# Patient Record
Sex: FEMALE | Race: OTHER | HISPANIC OR LATINO | URBAN - METROPOLITAN AREA
[De-identification: names, ages, dates, MRNs, and addresses within clinical notes are randomized per-mention and may not be internally consistent; named-entity substitution may affect disease eponyms.]

---

## 2023-11-09 ENCOUNTER — EMERGENCY (EMERGENCY)
Facility: HOSPITAL | Age: 28
LOS: 1 days | Discharge: HOME | End: 2023-11-09
Attending: STUDENT IN AN ORGANIZED HEALTH CARE EDUCATION/TRAINING PROGRAM | Admitting: EMERGENCY MEDICINE
Payer: MEDICAID

## 2023-11-09 VITALS
SYSTOLIC BLOOD PRESSURE: 118 MMHG | TEMPERATURE: 98 F | RESPIRATION RATE: 15 BRPM | HEART RATE: 92 BPM | OXYGEN SATURATION: 100 % | DIASTOLIC BLOOD PRESSURE: 86 MMHG

## 2023-11-09 DIAGNOSIS — J36 PERITONSILLAR ABSCESS: ICD-10-CM

## 2023-11-09 LAB
GRAM STN FLD: ABNORMAL
GRAM STN FLD: ABNORMAL
SPECIMEN SOURCE: SIGNIFICANT CHANGE UP
SPECIMEN SOURCE: SIGNIFICANT CHANGE UP

## 2023-11-09 PROCEDURE — 99223 1ST HOSP IP/OBS HIGH 75: CPT

## 2023-11-09 RX ORDER — KETOROLAC TROMETHAMINE 30 MG/ML
15 SYRINGE (ML) INJECTION ONCE
Refills: 0 | Status: DISCONTINUED | OUTPATIENT
Start: 2023-11-09 | End: 2023-11-09

## 2023-11-09 RX ORDER — DEXAMETHASONE 0.5 MG/5ML
10 ELIXIR ORAL ONCE
Refills: 0 | Status: COMPLETED | OUTPATIENT
Start: 2023-11-09 | End: 2023-11-09

## 2023-11-09 RX ORDER — LIDOCAINE HYDROCHLORIDE AND EPINEPHRINE 10; 10 MG/ML; UG/ML
10 INJECTION, SOLUTION INFILTRATION; PERINEURAL ONCE
Refills: 0 | Status: COMPLETED | OUTPATIENT
Start: 2023-11-09 | End: 2023-11-09

## 2023-11-09 RX ORDER — KETOROLAC TROMETHAMINE 30 MG/ML
30 SYRINGE (ML) INJECTION EVERY 6 HOURS
Refills: 0 | Status: COMPLETED | OUTPATIENT
Start: 2023-11-10 | End: 2023-11-11

## 2023-11-09 RX ORDER — LIDOCAINE 4 G/100G
10 CREAM TOPICAL ONCE
Refills: 0 | Status: COMPLETED | OUTPATIENT
Start: 2023-11-09 | End: 2023-11-09

## 2023-11-09 RX ORDER — DEXAMETHASONE 0.5 MG/5ML
10 ELIXIR ORAL ONCE
Refills: 0 | Status: DISCONTINUED | OUTPATIENT
Start: 2023-11-09 | End: 2023-11-09

## 2023-11-09 RX ORDER — TETRACAINE/BENZOCAINE/BUTAMBEN 2%-14%-2%
1 OINTMENT (GRAM) TOPICAL ONCE
Refills: 0 | Status: COMPLETED | OUTPATIENT
Start: 2023-11-09 | End: 2023-11-09

## 2023-11-09 RX ORDER — SODIUM CHLORIDE 9 MG/ML
1000 INJECTION INTRAMUSCULAR; INTRAVENOUS; SUBCUTANEOUS
Refills: 0 | Status: DISCONTINUED | OUTPATIENT
Start: 2023-11-09 | End: 2023-11-12

## 2023-11-09 RX ADMIN — Medication 102 MILLIGRAM(S): at 14:14

## 2023-11-09 RX ADMIN — Medication 30 MILLIGRAM(S): at 23:26

## 2023-11-09 RX ADMIN — Medication 102 MILLIGRAM(S): at 07:12

## 2023-11-09 RX ADMIN — Medication 15 MILLIGRAM(S): at 08:53

## 2023-11-09 RX ADMIN — Medication 100 MILLIGRAM(S): at 18:06

## 2023-11-09 RX ADMIN — Medication 15 MILLIGRAM(S): at 18:06

## 2023-11-09 RX ADMIN — Medication 100 MILLIGRAM(S): at 09:02

## 2023-11-09 RX ADMIN — SODIUM CHLORIDE 120 MILLILITER(S): 9 INJECTION INTRAMUSCULAR; INTRAVENOUS; SUBCUTANEOUS at 18:05

## 2023-11-09 RX ADMIN — Medication 30 MILLIGRAM(S): at 23:56

## 2023-11-09 RX ADMIN — LIDOCAINE HYDROCHLORIDE AND EPINEPHRINE 10 MILLILITER(S): 10; 10 INJECTION, SOLUTION INFILTRATION; PERINEURAL at 08:52

## 2023-11-09 RX ADMIN — Medication 1 SPRAY(S): at 08:53

## 2023-11-09 RX ADMIN — LIDOCAINE 10 MILLILITER(S): 4 CREAM TOPICAL at 08:53

## 2023-11-09 RX ADMIN — Medication 15 MILLIGRAM(S): at 07:12

## 2023-11-09 NOTE — ED ADULT NURSE NOTE - OBJECTIVE STATEMENT
Pt is A&O x 4, maintained on bedrest, shows no signs of acute distress. Presents to the ED as a transfer from [facility] for a ENT consult 2/2 to findings of a peritonsillar abscess with narrowing. Pt currently endorsing right jaw pain that radiates to her clavicle and difficulty swallowing x 1 week. Also endorsing intermittent fevers/chills and lower back pain. Pt able to speak in full sentences at this time. No drooling noted. Denies dysuria, n/v/d, dizziness/blurry vision, SOB or chest discomfort. VSS. Respirations even and unlabored. Right AC 20 gauge IV line noted upon arrival. States she received Clindamycin and "a pain medication" while at [facility] ED. Pt is A&O x 4, maintained on bedrest, shows no signs of acute distress. Presents to the ED as a transfer from [facility] for a ENT consult 2/2 to findings of a peritonsillar abscess with narrowing. Pt currently endorsing right jaw pain that radiates to her clavicle and difficulty swallowing x 1 week. Also endorsing intermittent fevers/chills and lower back pain. Pt able to speak in full sentences at this time. No drooling noted. Denies dysuria, n/v/d, dizziness/blurry vision, SOB or chest discomfort. VSS. Respirations even and unlabored. Right AC 20 gauge IV line noted upon arrival. States she received Clindamycin and "a pain medication" while at [facility] ED. Pending ENT consult. [place]  #[id number]: Pt is A&O x 4, maintained on bedrest, shows no signs of acute distress. Presents to the ED as a transfer from [facility] for a ENT consult 2/2 to findings of a peritonsillar abscess with narrowing. Pt currently endorsing right jaw pain that radiates to her clavicle and difficulty swallowing x 1 week. Also endorsing intermittent fevers/chills and lower back pain. Pt able to speak in full sentences at this time. No drooling noted. Denies dysuria, n/v/d, dizziness/blurry vision, SOB or chest discomfort. VSS. Respirations even and unlabored. Right AC 20 gauge IV line noted upon arrival. States she received Clindamycin and "a pain medication" while at [facility] ED. Pending ENT consult and medication administration.

## 2023-11-09 NOTE — CONSULT NOTE ADULT - ASSESSMENT
27 y/o F transferred to [facility] ED for ENT consult. Outside hospital CT neck shows R PTA. Patient s/p 2 dose of decadron and currently on clindamycin IV, she is s/p FNA and I&D of R PTA at bedside with 3 cc of pus. Tolerating procedure well.

## 2023-11-09 NOTE — PROCEDURE NOTE - ADDITIONAL PROCEDURE DETAILS
Topical Retinoid Pregnancy And Lactation Text: This medication is Pregnancy Category C. It is unknown if this medication is excreted in breast milk. Verbal Consent obtained by the patient. ED provider and RN notified prior to the bedside procedure  Procedure explained to patient prior to proceeding and worked patient through step by step as each action is carried out with .  Cetacaine sprayed onto R peritonsillar area. 3cc of 1% lidocaine with epinephrine 1:100,000 injected into R peritonsillar area. 18G needled used to aspirate 3cc of pus. 11blade used to make lacunar incision and hemostat used to explore wound and break up loculations any additional purulence was expressed, suctioned and irrigated with normal saline.  Pus was sent for culture.   Patient tolerated procedure well.

## 2023-11-09 NOTE — ED ADULT TRIAGE NOTE - CHIEF COMPLAINT QUOTE
Pt coming in as a transfer from [facility] for ENT consult s/p CT findings of Peritonsillar abscess with narrowing. Pt c/o right jaw pain and swelling x1 week. Pt received decadron 10mg IV, Toradol 30mg IV and Clindamycin IV at approximately 21:15 last night. No past medical hx. Pt denies chest pain, sob, trouble breathing. Airway patent. No stridor or drooling noted. Endorsing right sided jaw and neck pain. Arrives with 20G to right ac.

## 2023-11-09 NOTE — ED CDU PROVIDER INITIAL DAY NOTE - CARDIOVASCULAR NEGATIVE STATEMENT, MLM
Requested immunization administered Hep B kristyn 3rd dose   Immunization consent and questions reviewed.  No contraindications  See scanned page for vaccine consent and questionnaires  See immunization page for administration        Patient/parent/legal guardian verbalized understanding and signed consent for vaccine.   Copy of vaccine record provided to patient/parent.  Will route record to PCP/Pedia on fax number available on record    Patient was observed after administration and no side effect noted.   Patient discharged.        Assessment   Problem List Items Addressed This Visit     None      Visit Diagnoses     Encounter for immunization    -  Primary    Relevant Orders    HEP B VACC ADULT 3 DOSE, IM (Completed)         no chest pain and no edema.

## 2023-11-09 NOTE — CONSULT NOTE ADULT - PROBLEM SELECTOR RECOMMENDATION 9
- CDU for monitor due to significant postoral swelling secondary to R PTA, airway widely patent  - Culture obtained and sent to lab, result pending  - Please give another dose of decadron 10mg IV, at least 6 hours apart from the previous dose  - Please continue clindamycin IV while in CDU, may transition to oral clindamycin on discharge for a total of 10 days  - Trial of soft diet in a few hours   - No further ENT intervention or reassessment needed if patient is able to tolerate diet  - No objection from ENT to discharge patient after 3rd dose of decadron and tolerating PO diet  - Return precaution discussed with patient in detail, all questions answered  - Instruct patient to change her toothbrush after discharged home today and change again after complete course of antibiotics  - Please follow up outpatient in 2 weeks, can call [phone] for an appointment  - Case discussed with Dr. Capps

## 2023-11-09 NOTE — ED CDU PROVIDER INITIAL DAY NOTE - CLINICAL SUMMARY MEDICAL DECISION MAKING FREE TEXT BOX
27 y/o female with no significant PMHx who presented to the ED as a transfer from [facility] for right PTA.   Concern for right PTA  Sent to OBS for antibiotics

## 2023-11-09 NOTE — ED CDU PROVIDER INITIAL DAY NOTE - CONSIDERATION OF ADMISSION OBSERVATION
Given swelling and drainage sent to OBS for continued IV antibiotics Consideration of Admission/Observation

## 2023-11-09 NOTE — ED PROVIDER NOTE - TEMPLATE, MLM
1. Schedule upper endoscopy (EGD) with Dr. Kapil Arnold with IV twilight sedation. I will review with him your concerns regarding sedation. 2. Keep track of your symptoms and possible triggers and please let me know.  Perhaps this occurs at a certain time throu  Symptoms

## 2023-11-09 NOTE — ED PROVIDER NOTE - CLINICAL SUMMARY MEDICAL DECISION MAKING FREE TEXT BOX
28-year-old female Turkmen-speaking no past medical history presenting as transfer from [facility] for peritonsillar abscess for ENT.  Patient with approximately 4 days of worsening throat pain jaw pain ear pain and now difficulty swallowing.  Patient unable to tolerate p.o. liquid or solid food for the last 24 hours and has some difficulty swallowing secretions.  Denying any difficulty breathing, fever/chills, chest pain.  Patient had a CT there showing a right-sided peritonsillar abscess, she received IV clindamycin IV dexamethasone and IV Toradol.  AVSS, patient able to open jaw moderate amount, bilateral oropharyngeal swelling right more than left with left uvula deviation, no exudates.  Patient with diagnosed PTA on CT imaging, will provide additional dose of Dex, Toradol, and ENT consult for management.

## 2023-11-09 NOTE — ED CDU PROVIDER INITIAL DAY NOTE - OBJECTIVE STATEMENT
27 y/o female with no significant PMHx who presented to the ED as a transfer from [facility] for right PTA. Pt notes over the past wk having a sore throat with difficulty swallowing today. Pt denies any fever, chills, SOB, chest pain, or abd pain. Pt was given toradol, decadron, clindamycin and had a CT at [facility] and was found to have a right PTA and sent here for ENT. ENT saw the pt and drained the abscess and sent to OBS for IV antibiotics.

## 2023-11-09 NOTE — ED CDU PROVIDER INITIAL DAY NOTE - ATTENDING APP SHARED VISIT CONTRIBUTION OF CARE
Pt was seen and evaluated by me. Pt is a 27 y/o female with no significant PMHx who presented to the ED as a transfer from [facility] for right PTA. Pt notes over the past wk having a sore throat with difficulty swallowing today. Pt denies any fever, chills, SOB, chest pain, or abd pain. Pt was given toradol, decadron, clindamycin and had a CT at [facility] and was found to have a right PTA and sent here for ENT. ENT saw the pt and drained the abscess and sent to OBS for IV antibiotics.  VITALS: Vitals have been reviewed.  GEN APPEARANCE: Alert and cooperative, non-toxic appearing and in NAD  HEAD: Atraumatic, normocephalic.   EYES: PERRL, EOMI.   EARS: Gross hearing intact.   NOSE: No nasal discharge.   THROAT: MMM. Oral cavity and pharynx normal. Uvula midline. Swelling noted on right  NECK: Supple, no lymphadenopathy  CV: RRR, S1S2, no c/r/m/g. No cyanosis or pallor.   LUNGS: CTA b/l  ABDOMEN: Soft, NTND. No guarding or rebound.   MSK/EXT: Spine appears normal, no spine point tenderness.   NEURO: Alert, follows commands. Speech normal. Sensation and motor normal x4 extremities.   SKIN: Normal color for race, warm, dry and intact. No evidence of rash.  27 y/o female with no significant PMHx who presented to the ED as a transfer from [facility] for right PTA.   Concern for right PTA  Sent to OBS for antibiotics

## 2023-11-09 NOTE — ED ADULT NURSE REASSESSMENT NOTE - NS ED NURSE REASSESS COMMENT FT1
Pt resting comfortably at this time. Pt denies pain and discomfort. Pending CDU bed.
Report received from night shift RN. ENT at bedside attempting to peritonsillar abscess.
Pt resting in room 5. ENT drained the tonsillar abscess. Pt states she feels partial relief, pain is 4 out of 10. VS as noted in flow sheet. Airway is patent, respirations are even and unlabored.

## 2023-11-09 NOTE — ED PROVIDER NOTE - PHYSICAL EXAMINATION
GENERAL: well appearing in no acute distress, non-toxic appearing  HEENT:  +Jaw ROM limited 2/2 pain, oral mucosa moist, +oropharyngeal edema + erythema (R>L), +Left uvula deviation, no tonsilar exudates  CARDIAC: regular rate and rhythm, normal S1S2, no appreciable murmurs  PULM: normal breath sounds, clear to ascultation bilaterally, no rales, rhonchi, wheezing  NEURO: normal speech, AAOx3

## 2023-11-09 NOTE — CONSULT NOTE ADULT - SUBJECTIVE AND OBJECTIVE BOX
CC: Sore throat    HPI: 27 y/o F without significant PMHx transferred from outside hospital to [facility] ED for ENT consult. Patient reports she had sore throat for 1 week and progressively got worse and noted to have significant dysphagia, odynophagia, trismus and drooling, went to outside hospital ED yesterday and had CT neck done, shows R PTA. Patient seen and examined at bedside. Reports significant pain and swelling in her right jar region, and unable to eat or drink due to pain. Patient is able to speak and tolerating own secretion. Denies SOB, chest pain, fever, chills, change in voice and abdominal pain.        PAST MEDICAL & SURGICAL HISTORY:    Allergies    penicillin (Rash)    Intolerances      MEDICATIONS  (STANDING):  clindamycin IVPB 600 milliGRAM(s) IV Intermittent once  dexAMETHasone  Injectable 10 milliGRAM(s) IV Push Once  lidocaine 1%/epinephrine 1:100,000 Inj 10 milliLiter(s) Local Injection Once  lidocaine 2% Viscous 10 milliLiter(s) Swish and Spit Once  tetracaine/benzocaine/butamben Spray 1 Spray(s) Topical Once    MEDICATIONS  (PRN):      Social History: never smoker, denies use of ETOH    Family history: No pertinent family history in first degree relatives    ROS:   ENT: all negative except as noted in HPI   CV: denies palpitations  Pulm: denies SOB, cough, hemoptysis  GI: denies change in appetite, indigestion, n/v  : denies pertinent urinary symptoms, urgency  Neuro: denies numbness/tingling, loss of sensation  Psych: denies anxiety  MS: denies muscle weakness, instability  Heme: denies easy bruising or bleeding  Endo: denies heat/cold intolerance, excessive sweating  Vascular: denies LE edema    Vital Signs Last 24 Hrs  T(C): 37.2 (09 Nov 2023 08:46), Max: 37.2 (09 Nov 2023 08:46)  T(F): 98.9 (09 Nov 2023 08:46), Max: 98.9 (09 Nov 2023 08:46)  HR: 93 (09 Nov 2023 08:46) (92 - 93)  BP: 123/82 (09 Nov 2023 08:46) (118/86 - 123/82)  BP(mean): --  RR: 16 (09 Nov 2023 08:46) (15 - 16)  SpO2: 96% (09 Nov 2023 08:46) (96% - 100%)    Parameters below as of 09 Nov 2023 06:09  Patient On (Oxygen Delivery Method): room air         PHYSICAL EXAM:  Gen: NAD  Skin: No rashes, bruises, or lesions  Head: Normocephalic, Atraumatic  Face: no edema, erythema, or fluctuance. Parotid glands soft without mass  Eyes: no scleral injection  Ears: No evidence of any fluid drainage. No mastoid tenderness, erythema, or ear bulging  Nose: Nares bilaterally patent, no discharge  Mouth: No stridor, no drooling, + trismus.  Mucosa moist, tongue midline, + uvula deviation to the left with raise of R soft palate.  Neck: Flat, supple, no lymphadenopathy, trachea midline, no masses  Lymphatic: + bilateral cervical lymphadenopathy  Resp: breathing easily, no stridor  CV: no peripheral edema/cyanosis  GI: nondistended   Peripheral vascular: no JVD or edema  Neuro: facial nerve intact, no facial droop      Procedure  Verbal Consent obtained by the patient. ED provider and RN notified prior to the bedside procedure  Procedure explained to patient prior to proceeding and worked patient through step by step as each action is carried out with .  Cetacaine sprayed onto R peritonsillar area. 3cc of 1% lidocaine with epinephrine 1:100,000 injected into R peritonsillar area. 18G needled used to aspirate 3cc of pus. 11blade used to make lacunar incision and hemostat used to explore wound and break up loculations any additional purulence was expressed, suctioned and irrigated with normal saline.  Pus was sent for culture.   Patient tolerated procedure well.

## 2023-11-09 NOTE — PROCEDURE NOTE - NSICDXPROCEDURE_GEN_ALL_CORE_FT
PROCEDURES:  Incision and drainage of peritonsillar abscess 09-Nov-2023 09:16:32  Rohit Reinoso  Fine needle aspiration, neck 09-Nov-2023 09:17:23  Rohit Reinoso

## 2023-11-10 VITALS
SYSTOLIC BLOOD PRESSURE: 94 MMHG | OXYGEN SATURATION: 98 % | HEART RATE: 74 BPM | DIASTOLIC BLOOD PRESSURE: 62 MMHG | RESPIRATION RATE: 16 BRPM | TEMPERATURE: 98 F

## 2023-11-10 LAB
ALBUMIN SERPL ELPH-MCNC: 3.7 G/DL — SIGNIFICANT CHANGE UP (ref 3.3–5)
ALBUMIN SERPL ELPH-MCNC: 3.7 G/DL — SIGNIFICANT CHANGE UP (ref 3.3–5)
ALP SERPL-CCNC: 79 U/L — SIGNIFICANT CHANGE UP (ref 40–120)
ALP SERPL-CCNC: 79 U/L — SIGNIFICANT CHANGE UP (ref 40–120)
ALT FLD-CCNC: 19 U/L — SIGNIFICANT CHANGE UP (ref 4–33)
ALT FLD-CCNC: 19 U/L — SIGNIFICANT CHANGE UP (ref 4–33)
ANION GAP SERPL CALC-SCNC: 11 MMOL/L — SIGNIFICANT CHANGE UP (ref 7–14)
ANION GAP SERPL CALC-SCNC: 11 MMOL/L — SIGNIFICANT CHANGE UP (ref 7–14)
AST SERPL-CCNC: 10 U/L — SIGNIFICANT CHANGE UP (ref 4–32)
AST SERPL-CCNC: 10 U/L — SIGNIFICANT CHANGE UP (ref 4–32)
BILIRUB SERPL-MCNC: 0.3 MG/DL — SIGNIFICANT CHANGE UP (ref 0.2–1.2)
BILIRUB SERPL-MCNC: 0.3 MG/DL — SIGNIFICANT CHANGE UP (ref 0.2–1.2)
BUN SERPL-MCNC: 18 MG/DL — SIGNIFICANT CHANGE UP (ref 7–23)
BUN SERPL-MCNC: 18 MG/DL — SIGNIFICANT CHANGE UP (ref 7–23)
CALCIUM SERPL-MCNC: 8.8 MG/DL — SIGNIFICANT CHANGE UP (ref 8.4–10.5)
CALCIUM SERPL-MCNC: 8.8 MG/DL — SIGNIFICANT CHANGE UP (ref 8.4–10.5)
CHLORIDE SERPL-SCNC: 104 MMOL/L — SIGNIFICANT CHANGE UP (ref 98–107)
CHLORIDE SERPL-SCNC: 104 MMOL/L — SIGNIFICANT CHANGE UP (ref 98–107)
CO2 SERPL-SCNC: 21 MMOL/L — LOW (ref 22–31)
CO2 SERPL-SCNC: 21 MMOL/L — LOW (ref 22–31)
CREAT SERPL-MCNC: 0.44 MG/DL — LOW (ref 0.5–1.3)
CREAT SERPL-MCNC: 0.44 MG/DL — LOW (ref 0.5–1.3)
EGFR: 135 ML/MIN/1.73M2 — SIGNIFICANT CHANGE UP
EGFR: 135 ML/MIN/1.73M2 — SIGNIFICANT CHANGE UP
GLUCOSE SERPL-MCNC: 104 MG/DL — HIGH (ref 70–99)
GLUCOSE SERPL-MCNC: 104 MG/DL — HIGH (ref 70–99)
MAGNESIUM SERPL-MCNC: 2.5 MG/DL — SIGNIFICANT CHANGE UP (ref 1.6–2.6)
MAGNESIUM SERPL-MCNC: 2.5 MG/DL — SIGNIFICANT CHANGE UP (ref 1.6–2.6)
PHOSPHATE SERPL-MCNC: 3.1 MG/DL — SIGNIFICANT CHANGE UP (ref 2.5–4.5)
PHOSPHATE SERPL-MCNC: 3.1 MG/DL — SIGNIFICANT CHANGE UP (ref 2.5–4.5)
POTASSIUM SERPL-MCNC: 4.1 MMOL/L — SIGNIFICANT CHANGE UP (ref 3.5–5.3)
POTASSIUM SERPL-MCNC: 4.1 MMOL/L — SIGNIFICANT CHANGE UP (ref 3.5–5.3)
POTASSIUM SERPL-SCNC: 4.1 MMOL/L — SIGNIFICANT CHANGE UP (ref 3.5–5.3)
POTASSIUM SERPL-SCNC: 4.1 MMOL/L — SIGNIFICANT CHANGE UP (ref 3.5–5.3)
PROT SERPL-MCNC: 7.1 G/DL — SIGNIFICANT CHANGE UP (ref 6–8.3)
PROT SERPL-MCNC: 7.1 G/DL — SIGNIFICANT CHANGE UP (ref 6–8.3)
SODIUM SERPL-SCNC: 136 MMOL/L — SIGNIFICANT CHANGE UP (ref 135–145)
SODIUM SERPL-SCNC: 136 MMOL/L — SIGNIFICANT CHANGE UP (ref 135–145)

## 2023-11-10 PROCEDURE — 99239 HOSP IP/OBS DSCHRG MGMT >30: CPT

## 2023-11-10 RX ORDER — ACETAMINOPHEN 500 MG
650 TABLET ORAL ONCE
Refills: 0 | Status: COMPLETED | OUTPATIENT
Start: 2023-11-10 | End: 2023-11-10

## 2023-11-10 RX ADMIN — Medication 30 MILLIGRAM(S): at 06:39

## 2023-11-10 RX ADMIN — Medication 650 MILLIGRAM(S): at 09:49

## 2023-11-10 RX ADMIN — Medication 650 MILLIGRAM(S): at 10:20

## 2023-11-10 RX ADMIN — Medication 100 MILLIGRAM(S): at 02:46

## 2023-11-10 RX ADMIN — Medication 100 MILLIGRAM(S): at 09:49

## 2023-11-10 RX ADMIN — SODIUM CHLORIDE 120 MILLILITER(S): 9 INJECTION INTRAMUSCULAR; INTRAVENOUS; SUBCUTANEOUS at 09:50

## 2023-11-10 NOTE — ED CDU PROVIDER SUBSEQUENT DAY NOTE - CLINICAL SUMMARY MEDICAL DECISION MAKING FREE TEXT BOX
IV hydration, IV clindamycin, recommendations as per ENT team following pt, supportive care, general observation care / monitoring.

## 2023-11-10 NOTE — ED CDU PROVIDER SUBSEQUENT DAY NOTE - NSALCOHOLUSECOMMENT_GEN_ALL_CORE_FT
Please do your labs fasting (10-12 hours, water only) at any [facility] lab between now-10/2022.    
Pt denies.

## 2023-11-10 NOTE — ED CDU PROVIDER SUBSEQUENT DAY NOTE - PROGRESS NOTE DETAILS
MATEO Farnsworth:  28-year-old female with no significant past medical history presented to the ER as a transfer from [facility] for PTA.  Patient seen and evaluated by ENT I&D performed, patient placed in CDU for IV antibiotics and IV Decadron.  Pt cleared by ENT for discharge home.  Patient feels improved, tolerating PO, discharge and results reviewed with patient via  ID# [id number].

## 2023-11-10 NOTE — ED CDU PROVIDER DISPOSITION NOTE - PATIENT PORTAL LINK FT
You can access the FollowMyHealth Patient Portal offered by [facility] by registering at the following website: [email or web address]. By joining [facility]’s FollowMyHealth portal, you will also be able to view your health information using other applications (apps) compatible with our system.

## 2023-11-10 NOTE — ED CDU PROVIDER DISPOSITION NOTE - NSFOLLOWUPINSTRUCTIONS_ED_ALL_ED_FT
Follow up with your Primary Medical Doctor in 1-2 days.  Follow up with Ear Nose and Throat Specialist in 1-2 days call to schedule an appointment [phone].  Soft diet.  Take Clindamycin 300mg 4 x a day x 10 days as prescribed.  Return to the ER for any persistent/worsening or new symptoms fevers, chills, difficulty swallowing, unable to eat/drink or any concerning symptoms.

## 2023-11-10 NOTE — ED CDU PROVIDER DISPOSITION NOTE - CLINICAL COURSE
MATEO Farnsworth:  28-year-old female with no significant past medical history presented to the ER as a transfer from [facility] for PTA.  Patient seen and evaluated by ENT I&D performed, patient placed in CDU for IV antibiotics and IV Decadron.  Pt cleared by ENT for discharge home.  Patient feels improved, tolerating PO, discharge and results reviewed with patient via  ID# MATEO Farnsworth:  28-year-old female with no significant past medical history presented to the ER as a transfer from [facility] for PTA.  Patient seen and evaluated by ENT I&D performed, patient placed in CDU for IV antibiotics and IV Decadron.  Pt cleared by ENT for discharge home.  Patient feels improved, tolerating PO, discharge and results reviewed with patient via  ID# [id number].

## 2023-11-10 NOTE — ED CDU PROVIDER SUBSEQUENT DAY NOTE - ATTENDING APP SHARED VISIT CONTRIBUTION OF CARE
Isabel Sumner MD attending physician patient is here as she was transferred from outside hospital for her peritonsillar abscess.  Patient has significant improvement since yesterday.  She received 3 doses of Decadron she was seen by ENT and she has been receiving clinda as her antibiotic.  She is much improved has a normal voice still has pain and will need to go home on NSAIDs for pain.  Patient understands to return for any worsening.  She is able to eat and drink here.    I performed a history and physical exam of the patient and discussed their management with the resident and /or advanced care provider. I reviewed the resident and /or ACP's note and agree with the documented findings and plan of care. My medical decison making and observations are found above.

## 2023-11-10 NOTE — ED CDU PROVIDER SUBSEQUENT DAY NOTE - PHYSICAL EXAMINATION
CONSTITUTIONAL:  Well appearing, awake, alert, oriented to person, place, time/situation and in no apparent distress.  Pt. is objectively comfortable appearing and verbalizing in full, clear, effortless sentences.  ENMT: No craniofacial asymmetry noted on gross external inspection. Airway patent.  Nasal mucosa clear.  Moist mucous membranes.  Tonsillar hyperemia noted; +Right soft palate inflammation; uvula with no edema noted.  Neck supple.  EYES:  Clear OU.  CARDIAC:  Normal rate, regular rhythm.  Heart sounds S1 S2.  No murmurs, gallops, or rubs.  RESPIRATORY:  Breath sounds clear and equal bilaterally.  No wheezes, no rales, no rhonchi.  GASTROINTESTINAL:  Abdomen soft, non-distended, non-tender.  No rebound, no guarding.  NEUROLOGICAL:  Alert and oriented to person/place/time/situation.  No focal deficits; no tremors noted.   MUSCULOSKELETAL:  Range of motion is not limited.  Gait stable.    SKIN:  Skin color unremarkable.  Skin warm, dry.   PSYCHIATRIC:  Alert and oriented to person/place/time/situation.  Mood and affect WNL.  No apparent risk to self or others.

## 2023-11-10 NOTE — ED CDU PROVIDER SUBSEQUENT DAY NOTE - HISTORY
27 y/o female with no significant PMHx who presented to the ED as a transfer from [facility] for right PTA. Pt notes over the past wk having a sore throat with difficulty swallowing today. Pt denies any fever, chills, SOB, chest pain, or abd pain. Pt was given toradol, decadron, clindamycin and had a CT at [facility] and was found to have a right PTA and sent here for ENT. ENT saw the pt and drained the abscess and sent to OBS for IV antibiotics.  In the interim, pt objectively noted to be resting comfortably; pt has been clinically stable; no issues thus far; ENT is following pt, AM labs are ordered.

## 2023-11-13 PROBLEM — Z78.9 OTHER SPECIFIED HEALTH STATUS: Chronic | Status: ACTIVE | Noted: 2023-11-09

## 2023-11-14 LAB
CULTURE RESULTS: ABNORMAL
CULTURE RESULTS: ABNORMAL
SPECIMEN SOURCE: SIGNIFICANT CHANGE UP
SPECIMEN SOURCE: SIGNIFICANT CHANGE UP

## 2023-12-12 PROBLEM — Z00.00 ENCOUNTER FOR PREVENTIVE HEALTH EXAMINATION: Status: ACTIVE | Noted: 2023-12-12

## 2023-12-18 ENCOUNTER — APPOINTMENT (OUTPATIENT)
Dept: OTOLARYNGOLOGY | Facility: CLINIC | Age: 28
End: 2023-12-18

## 2024-01-17 ENCOUNTER — APPOINTMENT (OUTPATIENT)
Dept: OTOLARYNGOLOGY | Facility: CLINIC | Age: 29
End: 2024-01-17

## 2024-03-14 NOTE — ED PROVIDER NOTE - ATTENDING CONTRIBUTION TO CARE
Post-Op Assessment Note    CV Status:  Stable    Pain management: satisfactory to patient       Mental Status:  Awake and alert   Hydration Status:  Stable   PONV Controlled:  None   Airway Patency:  Patent  Airway: intubated     Post Op Vitals Reviewed: Yes    No anethesia notable event occurred.    Staff: KATE               /84 (03/13/24 2020)    Temp (!) 97 °F (36.1 °C) (03/13/24 2020)    Pulse 96 (03/13/24 2020)   Resp 22 (03/13/24 2020)    SpO2 99 % (03/13/24 2020)      
27 yo F with no reported past medical history who presents to the ED as a transfer from outside hospital for PTA. Pt endorses 1 week of sore throat which has worsened, now having difficulty tolerating PO secondary to pain. Able to tolerate secretions. Was seen at [facility], given toradol, decadron, clindamycin and had a CT neck which demonstrated a R PTA. Transferred here for ENT evaluation. On exam, pt has pharyngeal erythema, uvular deviation to the L and swelling around the R tonsil. Will give toradol for pain control and another dose of decadron per ENT. Plan for ENT evaluation.